# Patient Record
Sex: MALE | Race: WHITE | Employment: PART TIME | RURAL
[De-identification: names, ages, dates, MRNs, and addresses within clinical notes are randomized per-mention and may not be internally consistent; named-entity substitution may affect disease eponyms.]

---

## 2024-07-25 ENCOUNTER — HOSPITAL ENCOUNTER (INPATIENT)
Facility: HOSPITAL | Age: 24
LOS: 2 days | Discharge: HOME | DRG: 751 | End: 2024-07-27
Attending: EMERGENCY MEDICINE | Admitting: PSYCHIATRY & NEUROLOGY
Payer: COMMERCIAL

## 2024-07-25 DIAGNOSIS — F32.A ANXIETY AND DEPRESSION: ICD-10-CM

## 2024-07-25 DIAGNOSIS — R45.851 SUICIDAL IDEATION: Primary | ICD-10-CM

## 2024-07-25 DIAGNOSIS — F41.9 ANXIETY AND DEPRESSION: ICD-10-CM

## 2024-07-25 PROBLEM — F33.9 MAJOR DEPRESSIVE DISORDER, RECURRENT (HCC): Status: ACTIVE | Noted: 2024-07-25

## 2024-07-25 PROBLEM — F33.2 SEVERE RECURRENT MAJOR DEPRESSION WITHOUT PSYCHOTIC FEATURES (HCC): Status: ACTIVE | Noted: 2024-07-25

## 2024-07-25 LAB
AMPHET UR QL SCN: NEGATIVE
ANION GAP SERPL CALC-SCNC: 11 MMOL/L (ref 5–15)
APAP SERPL-MCNC: <2 UG/ML (ref 10–30)
APPEARANCE UR: CLEAR
BACTERIA URNS QL MICRO: NEGATIVE /HPF
BARBITURATES UR QL SCN: NEGATIVE
BASOPHILS # BLD: 0 K/UL (ref 0–0.1)
BASOPHILS NFR BLD: 1 % (ref 0–1)
BENZODIAZ UR QL: NEGATIVE
BILIRUB UR QL: NEGATIVE
BUN SERPL-MCNC: 11 MG/DL (ref 6–20)
BUN/CREAT SERPL: 11 (ref 12–20)
CALCIUM SERPL-MCNC: 10.1 MG/DL (ref 8.5–10.1)
CANNABINOIDS UR QL SCN: POSITIVE
CHLORIDE SERPL-SCNC: 103 MMOL/L (ref 97–108)
CO2 SERPL-SCNC: 28 MMOL/L (ref 21–32)
COCAINE UR QL SCN: NEGATIVE
COLOR UR: ABNORMAL
CREAT SERPL-MCNC: 1.01 MG/DL (ref 0.7–1.3)
DIFFERENTIAL METHOD BLD: NORMAL
EOSINOPHIL # BLD: 0.1 K/UL (ref 0–0.4)
EOSINOPHIL NFR BLD: 1 % (ref 0–7)
EPITH CASTS URNS QL MICRO: NORMAL /LPF
ERYTHROCYTE [DISTWIDTH] IN BLOOD BY AUTOMATED COUNT: 11.9 % (ref 11.5–14.5)
ETHANOL SERPL-MCNC: <10 MG/DL (ref 0–0.08)
GLUCOSE SERPL-MCNC: 95 MG/DL (ref 65–100)
GLUCOSE UR STRIP.AUTO-MCNC: NEGATIVE MG/DL
HCT VFR BLD AUTO: 41.3 % (ref 36.6–50.3)
HGB BLD-MCNC: 14.8 G/DL (ref 12.1–17)
HGB UR QL STRIP: ABNORMAL
IMM GRANULOCYTES # BLD AUTO: 0 K/UL (ref 0–0.04)
IMM GRANULOCYTES NFR BLD AUTO: 0 % (ref 0–0.5)
KETONES UR QL STRIP.AUTO: NEGATIVE MG/DL
LEUKOCYTE ESTERASE UR QL STRIP.AUTO: NEGATIVE
LYMPHOCYTES # BLD: 2 K/UL (ref 0.8–3.5)
LYMPHOCYTES NFR BLD: 35 % (ref 12–49)
Lab: ABNORMAL
MCH RBC QN AUTO: 29.7 PG (ref 26–34)
MCHC RBC AUTO-ENTMCNC: 35.8 G/DL (ref 30–36.5)
MCV RBC AUTO: 82.8 FL (ref 80–99)
METHADONE UR QL: NEGATIVE
MONOCYTES # BLD: 0.3 K/UL (ref 0–1)
MONOCYTES NFR BLD: 6 % (ref 5–13)
NEUTS SEG # BLD: 3.3 K/UL (ref 1.8–8)
NEUTS SEG NFR BLD: 57 % (ref 32–75)
NITRITE UR QL STRIP.AUTO: NEGATIVE
NRBC # BLD: 0 K/UL (ref 0–0.01)
NRBC BLD-RTO: 0 PER 100 WBC
OPIATES UR QL: NEGATIVE
PCP UR QL: NEGATIVE
PH UR STRIP: 8 (ref 5–8)
PLATELET # BLD AUTO: 236 K/UL (ref 150–400)
PMV BLD AUTO: 10 FL (ref 8.9–12.9)
POTASSIUM SERPL-SCNC: 3.9 MMOL/L (ref 3.5–5.1)
PROT UR STRIP-MCNC: ABNORMAL MG/DL
RBC # BLD AUTO: 4.99 M/UL (ref 4.1–5.7)
RBC #/AREA URNS HPF: NORMAL /HPF (ref 0–5)
SALICYLATES SERPL-MCNC: <1.7 MG/DL (ref 2.8–20)
SODIUM SERPL-SCNC: 142 MMOL/L (ref 136–145)
SP GR UR REFRACTOMETRY: 1.01 (ref 1–1.03)
UROBILINOGEN UR QL STRIP.AUTO: 0.2 EU/DL (ref 0.2–1)
WBC # BLD AUTO: 5.7 K/UL (ref 4.1–11.1)
WBC URNS QL MICRO: NORMAL /HPF (ref 0–4)

## 2024-07-25 PROCEDURE — 85025 COMPLETE CBC W/AUTO DIFF WBC: CPT

## 2024-07-25 PROCEDURE — 80179 DRUG ASSAY SALICYLATE: CPT

## 2024-07-25 PROCEDURE — 6370000000 HC RX 637 (ALT 250 FOR IP): Performed by: PSYCHIATRY & NEUROLOGY

## 2024-07-25 PROCEDURE — 36415 COLL VENOUS BLD VENIPUNCTURE: CPT

## 2024-07-25 PROCEDURE — 99285 EMERGENCY DEPT VISIT HI MDM: CPT

## 2024-07-25 PROCEDURE — 6370000000 HC RX 637 (ALT 250 FOR IP): Performed by: EMERGENCY MEDICINE

## 2024-07-25 PROCEDURE — 80048 BASIC METABOLIC PNL TOTAL CA: CPT

## 2024-07-25 PROCEDURE — 80143 DRUG ASSAY ACETAMINOPHEN: CPT

## 2024-07-25 PROCEDURE — 81001 URINALYSIS AUTO W/SCOPE: CPT

## 2024-07-25 PROCEDURE — 1240000000 HC EMOTIONAL WELLNESS R&B

## 2024-07-25 PROCEDURE — 80307 DRUG TEST PRSMV CHEM ANLYZR: CPT

## 2024-07-25 PROCEDURE — 90791 PSYCH DIAGNOSTIC EVALUATION: CPT

## 2024-07-25 PROCEDURE — 82077 ASSAY SPEC XCP UR&BREATH IA: CPT

## 2024-07-25 RX ORDER — ACETAMINOPHEN 325 MG/1
650 TABLET ORAL
Status: COMPLETED | OUTPATIENT
Start: 2024-07-25 | End: 2024-07-25

## 2024-07-25 RX ORDER — ACETAMINOPHEN 325 MG/1
650 TABLET ORAL EVERY 4 HOURS PRN
Status: DISCONTINUED | OUTPATIENT
Start: 2024-07-25 | End: 2024-07-27 | Stop reason: HOSPADM

## 2024-07-25 RX ORDER — POLYETHYLENE GLYCOL 3350 17 G/17G
17 POWDER, FOR SOLUTION ORAL DAILY PRN
Status: DISCONTINUED | OUTPATIENT
Start: 2024-07-25 | End: 2024-07-27 | Stop reason: HOSPADM

## 2024-07-25 RX ORDER — MAGNESIUM HYDROXIDE/ALUMINUM HYDROXICE/SIMETHICONE 120; 1200; 1200 MG/30ML; MG/30ML; MG/30ML
30 SUSPENSION ORAL EVERY 6 HOURS PRN
Status: DISCONTINUED | OUTPATIENT
Start: 2024-07-25 | End: 2024-07-27 | Stop reason: HOSPADM

## 2024-07-25 RX ORDER — TRAZODONE HYDROCHLORIDE 50 MG/1
50 TABLET ORAL NIGHTLY PRN
Status: DISCONTINUED | OUTPATIENT
Start: 2024-07-25 | End: 2024-07-27 | Stop reason: HOSPADM

## 2024-07-25 RX ORDER — HYDROXYZINE HYDROCHLORIDE 25 MG/1
50 TABLET, FILM COATED ORAL 3 TIMES DAILY PRN
Status: DISCONTINUED | OUTPATIENT
Start: 2024-07-25 | End: 2024-07-27 | Stop reason: HOSPADM

## 2024-07-25 RX ORDER — LORAZEPAM 2 MG/ML
1 INJECTION INTRAMUSCULAR EVERY 6 HOURS PRN
Status: DISCONTINUED | OUTPATIENT
Start: 2024-07-25 | End: 2024-07-27 | Stop reason: HOSPADM

## 2024-07-25 RX ORDER — IBUPROFEN 400 MG/1
400 TABLET ORAL EVERY 6 HOURS PRN
Status: DISCONTINUED | OUTPATIENT
Start: 2024-07-25 | End: 2024-07-27 | Stop reason: HOSPADM

## 2024-07-25 RX ORDER — LORAZEPAM 1 MG/1
1 TABLET ORAL EVERY 6 HOURS PRN
Status: DISCONTINUED | OUTPATIENT
Start: 2024-07-25 | End: 2024-07-27 | Stop reason: HOSPADM

## 2024-07-25 RX ORDER — POLYETHYLENE GLYCOL 3350 17 G
2 POWDER IN PACKET (EA) ORAL
Status: DISCONTINUED | OUTPATIENT
Start: 2024-07-25 | End: 2024-07-27 | Stop reason: HOSPADM

## 2024-07-25 RX ADMIN — NICOTINE POLACRILEX 2 MG: 2 LOZENGE ORAL at 18:42

## 2024-07-25 RX ADMIN — HYDROXYZINE HYDROCHLORIDE 50 MG: 25 TABLET ORAL at 15:27

## 2024-07-25 RX ADMIN — ACETAMINOPHEN 650 MG: 325 TABLET ORAL at 12:52

## 2024-07-25 RX ADMIN — NICOTINE POLACRILEX 2 MG: 2 LOZENGE ORAL at 15:27

## 2024-07-25 NOTE — ED TRIAGE NOTES
Pt arrived with complaint of SI.  Pt reports he has a history of depression, anxiety and PTSD but not on medications at this time.  Pt reports problems at home and in his relationship, pt tearful at triage.  Pt reports that his plan would be to either use a knife or gun to kill himself, pt reports no guns in his home but he does have access to them.  Pt is awake and alert.  Pt educated on ER flow

## 2024-07-25 NOTE — ED NOTES
Left message with [facility] regarding this patient needing to be evaluated  
Pt reports pain (4/10) on right side of his head. MD rock  
Pt transferred to [facility] via wheelchair with security  
Reyt consult with sujit at this time.  
Security notified of patient and that he will have to sit with patient  
Shannon called from [employer]  pt is high risk and the plan will be to admit  
TRANSFER - OUT REPORT:    Verbal report given to Jesusita Gill RN on Pablo Membreno  being transferred to [facility] for routine progression of patient care       Report consisted of patient's Situation, Background, Assessment and   Recommendations(SBAR).     Information from the following report(s) ED Encounter Summary, ED SBAR, MAR, Recent Results, and Neuro Assessment was reviewed with the receiving nurse.    [place] Fall Assessment:    Presents to emergency department  because of falls (Syncope, seizure, or loss of consciousness): No  Age > 70: No  Altered Mental Status, Intoxication with alcohol or substance confusion (Disorientation, impaired judgment, poor safety awaremess, or inability to follow instructions): No  Impaired Mobility: Ambulates or transfers with assistive devices or assistance; Unable to ambulate or transer.: No  Nursing Judgement: No          Lines:       Opportunity for questions and clarification was provided.      Patient transported with:  Tech       
PATIENT UNDRESSED? Yes  ARE THERE WOUNDS PRESENT? No  ARE THE WOUNDS DOCUMENTED? N/a    RESTRAINTS IN USE: No    IS THE DOCUMENTATION COMPLETE? N/a  Is there a current order?  N/a  When does it ? N/a       Vital Signs:  MEWS Score: 1/ Level of Consciousness: Alert (0)    Vitals:    24 1034   BP: 128/78   Pulse: 83   Resp: 20   Temp: 98.3 °F (36.8 °C)   TempSrc: Oral   SpO2: 100%   Weight: 70.3 kg (155 lb)   Height: 1.727 m (5' 8\")          Pain 1: 3  Pain Scale 1: 0-10    REVIEW:    IP UNIT CALLED NOTE IS READY: Yes

## 2024-07-25 NOTE — CONSULTS
Objective:   VITALS:    Patient Vitals for the past 24 hrs:   BP Temp Temp src Pulse Resp SpO2 Height Weight   24 1413 125/80 97.5 °F (36.4 °C) Oral 67 20 100 % 1.727 m (5' 8\") 69.2 kg (152 lb 9.6 oz)   24 1034 128/78 98.3 °F (36.8 °C) Oral 83 20 100 % 1.727 m (5' 8\") 70.3 kg (155 lb)       Temp (24hrs), Av.9 °F (36.6 °C), Min:97.5 °F (36.4 °C), Max:98.3 °F (36.8 °C)        O2 Device: None (Room air)    Wt Readings from Last 12 Encounters:   24 69.2 kg (152 lb 9.6 oz)         PHYSICAL EXAM:  General:    Alert, cooperative, appears stated age.     Lungs:   CTA b/l.  No wheezing or Rhonchi. No rales.  Chest wall:  No tenderness.  No accessory muscle use.  Heart:   Regular  rhythm,  No  Murmur.   No edema  Abdomen:   Soft, NT. ND  BS+  Extremities: No cyanosis.  No clubbing,      Skin turgor normal, Radial dial pulse 2+. Capillary refill normal  Neurologic: No facial asymmetry. No aphasia or slurred speech. Symmetrical strength, Sensation grossly intact. AAOx4.         LAB DATA REVIEWED:    Recent Results (from the past 12 hour(s))   Basic Metabolic Panel    Collection Time: 24 11:00 AM   Result Value Ref Range    Sodium 142 136 - 145 mmol/L    Potassium 3.9 3.5 - 5.1 mmol/L    Chloride 103 97 - 108 mmol/L    CO2 28 21 - 32 mmol/L    Anion Gap 11 5 - 15 mmol/L    Glucose 95 65 - 100 mg/dL    BUN 11 6 - 20 MG/DL    Creatinine 1.01 0.70 - 1.30 MG/DL    BUN/Creatinine Ratio 11 (L) 12 - 20      Est, Glom Filt Rate >90 >60 ml/min/1.73m2    Calcium 10.1 8.5 - 10.1 MG/DL   Ethanol    Collection Time: 24 11:00 AM   Result Value Ref Range    Ethanol Lvl <10 <10 MG/DL   Acetaminophen Level “IF” accidental or intentional ingestion.    Collection Time: 24 11:00 AM   Result Value Ref Range    Acetaminophen Level <2 (L) 10 - 30 ug/mL   Salicylate “IF” accidental or intentional ingestion.    Collection Time: 24 11:00 AM   Result Value Ref Range    Salicylate Lvl <1.7 (L) 2.8 -

## 2024-07-25 NOTE — [facility] NOTE
[facility] assessment completed, and suicide risk level noted to be high. Charge Nurse, Alison and Physician, Dr. Mirella Macdonald notified. Concerns not observed.  Alison confirms pt has security present in the room for constant observation at this time.        
[employer] notified regarding need for consult.   
voiced at this time.    The patient's greatest support comes from his mother and this person will be involved with the treatment.    The patient has not been in an event described as horrible or outside the realm of ordinary life experience either currently or in the past.  The patient has not been a victim of sexual/physical abuse.    Section VII - Other Areas of Clinical Concern  The highest grade achieved is some college with the overall quality of school experience being described as not assessed.  The patient is currently employed and speaks English as a primary language.  The patient has no communication impairments affecting communication. The patient's preference for learning can be described as: can read and write adequately.  The patient's hearing is normal.  The patient's vision is normal.      Anne-Marie Mendez LMSW

## 2024-07-26 PROBLEM — F33.1 MAJOR DEPRESSIVE DISORDER, RECURRENT EPISODE, MODERATE (HCC): Status: ACTIVE | Noted: 2024-07-25

## 2024-07-26 PROCEDURE — 6370000000 HC RX 637 (ALT 250 FOR IP): Performed by: PSYCHIATRY & NEUROLOGY

## 2024-07-26 PROCEDURE — 1240000000 HC EMOTIONAL WELLNESS R&B

## 2024-07-26 RX ORDER — BUPROPION HYDROCHLORIDE 150 MG/1
300 TABLET ORAL DAILY
Status: DISCONTINUED | OUTPATIENT
Start: 2024-07-28 | End: 2024-07-27 | Stop reason: HOSPADM

## 2024-07-26 RX ORDER — BUPROPION HYDROCHLORIDE 150 MG/1
150 TABLET ORAL DAILY
Status: COMPLETED | OUTPATIENT
Start: 2024-07-26 | End: 2024-07-27

## 2024-07-26 RX ORDER — BUPROPION HYDROCHLORIDE 300 MG/1
300 TABLET ORAL DAILY
Qty: 30 TABLET | Refills: 3 | Status: SHIPPED | OUTPATIENT
Start: 2024-07-28

## 2024-07-26 RX ADMIN — BUPROPION HYDROCHLORIDE 150 MG: 150 TABLET, EXTENDED RELEASE ORAL at 08:44

## 2024-07-26 RX ADMIN — NICOTINE POLACRILEX 2 MG: 2 LOZENGE ORAL at 20:29

## 2024-07-26 RX ADMIN — NICOTINE POLACRILEX 2 MG: 2 LOZENGE ORAL at 10:26

## 2024-07-26 RX ADMIN — NICOTINE POLACRILEX 2 MG: 2 LOZENGE ORAL at 12:17

## 2024-07-26 RX ADMIN — NICOTINE POLACRILEX 2 MG: 2 LOZENGE ORAL at 17:52

## 2024-07-26 RX ADMIN — NICOTINE POLACRILEX 2 MG: 2 LOZENGE ORAL at 14:56

## 2024-07-26 NOTE — PLAN OF CARE
Problem: Pain  Goal: Verbalizes/displays adequate comfort level or baseline comfort level  7/26/2024 1254 by Virginia Camilo, RN  Outcome: Progressing  7/25/2024 2318 by Aniceto Denise, RN  Outcome: Progressing

## 2024-07-26 NOTE — GROUP NOTE
Group Therapy Note    Date: 7/26/2024    Group Start Time: 0900  Group End Time: 0950  Group Topic: Process Group - Inpatient    [facility]    Bhargavi Mulligan LCSW        Group Therapy Note    Attendees: 6 scheduled    Focus of session was on handout from [employer] “Growth Mindset.”  We discussed the definition of a growth mindset which is believing you can develop abilities through hard work. In contrast, a fixed mindset means believing abilities are innate-you either have them or you don't.  We spoke about how having a growth mindset will allow pt to embrace challenges as opportunities to learn and grow.  We spoke about the tips to foster and strengthen a growth mindset and the impact that will have on patients' day to day life.         Patient's Goal:   Discharge to home or other facility with appropriate resources     Notes:  Will participated in group with prompting.  He spoke about how he is motivated and hopeful for his future.  He spoke about his goals are to go finish school and be able to move out on his own.  He spoke about how he is ready to start therapy again and he has \"a lot to talk about and work on.\"      Status After Intervention:  Improved    Participation Level: Active Listener and Interactive    Participation Quality: Appropriate, Attentive, Sharing, and Supportive      Speech:  normal      Thought Process/Content: Logical  Linear      Affective Functioning: Congruent      Mood: anxious      Level of consciousness:  Alert, Oriented x4, and Attentive      Response to Learning: Able to verbalize current knowledge/experience      Endings: None Reported    Modes of Intervention: Education, Support, Socialization, Exploration, and Clarifying      Discipline Responsible: /Counselor      Signature:  Bhargavi Mulligan LCSW

## 2024-07-26 NOTE — PLAN OF CARE
Problem: Discharge Planning  Goal: Discharge to home or other facility with appropriate resources  Outcome: Progressing  Flowsheets (Taken 7/25/2024 1419 by Carolynn Dugan, RN)  Discharge to home or other facility with appropriate resources: Identify barriers to discharge with patient and caregiver     Problem: Pain  Goal: Verbalizes/displays adequate comfort level or baseline comfort level  Outcome: Progressing

## 2024-07-26 NOTE — H&P
[address]  [address]                                PSYCH H&P      PATIENT NAME: ROSAURA ADAM             : 2000  MED REC NO: [id number]                       ROOM: 234  ACCOUNT NO: [id number]                       ADMIT DATE: 2024  PROVIDER: Alexandre Guzman MD      HISTORY OF PRESENT ILLNESS:  The patient is a 23-year-old single male, who was admitted voluntarily through the [facility] after presenting there with worsening symptoms of depression over the past month, to the point where he was starting to have some suicidal thoughts.  Although he had not made any attempts to ever harm himself, he stated he was having fleeting thoughts about using a gun or a knife, although he never came close to acting on it.  However, he realized he needed to reach out for some help and came to the emergency room.  He states that a particular trigger that led to him coming in was having essentially a slight argument with his father who is a .  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  This was obviously upsetting to him and he then got into argument with his partner, which made him feel even worse by himself and started triggering some suicidal thoughts.    He states that his neurovegetative functioning has been worsening over the past month to where he is only sleeping about 4-5 hours.  He is not eating well and has lost 20 to 25 pounds over the past several months.  His energy level is at least fair and he states he still has some motivation to do things, and he also states that he is not severely anhedonic.  However, he has moments of feeling helpless and hopeless, as well as having excessive feelings of guilt.  He denies any history of symptoms of alexis or

## 2024-07-27 VITALS
SYSTOLIC BLOOD PRESSURE: 102 MMHG | DIASTOLIC BLOOD PRESSURE: 89 MMHG | WEIGHT: 152.6 LBS | RESPIRATION RATE: 16 BRPM | TEMPERATURE: 97.7 F | HEIGHT: 68 IN | BODY MASS INDEX: 23.13 KG/M2 | OXYGEN SATURATION: 100 % | HEART RATE: 71 BPM

## 2024-07-27 PROCEDURE — 6370000000 HC RX 637 (ALT 250 FOR IP): Performed by: PSYCHIATRY & NEUROLOGY

## 2024-07-27 RX ADMIN — BUPROPION HYDROCHLORIDE 150 MG: 150 TABLET, EXTENDED RELEASE ORAL at 08:04

## 2024-07-27 NOTE — PLAN OF CARE
Problem: Discharge Planning  Goal: Discharge to home or other facility with appropriate resources  Outcome: Adequate for Discharge  Flowsheets (Taken 7/27/2024 0806)  Discharge to home or other facility with appropriate resources: Identify barriers to discharge with patient and caregiver     Problem: Pain  Goal: Verbalizes/displays adequate comfort level or baseline comfort level  7/27/2024 1023 by Virginia Camilo RN  Outcome: Adequate for Discharge  7/27/2024 0906 by Virginia Camilo RN  Outcome: Progressing     Problem: Self Harm/Suicidality  Goal: Absence of physical injury  Description: Absence of physical injury  7/27/2024 1023 by Virginia Camilo RN  Outcome: Adequate for Discharge  7/27/2024 0906 by Virginia Camilo RN  Outcome: Progressing     Problem: Depression  Goal: Appears well-rested  Description: Appears well-rested  7/27/2024 1023 by Virginia Camilo RN  Outcome: Adequate for Discharge  7/27/2024 0906 by Virginia Camilo RN  Outcome: Progressing  Goal: No signs of behavioral stress  Description: No signs of behavioral stress  7/27/2024 1023 by Virginia Camilo RN  Outcome: Adequate for Discharge  7/27/2024 0906 by Virginia Camilo RN  Outcome: Progressing     Problem: Anxiety  Goal: Knowledge of positive coping patterns  Description: Knowledge of positive coping patterns  Outcome: Adequate for Discharge

## 2024-07-27 NOTE — BH NOTE
Behavioral Health Interdisciplinary Rounds     Patient Name: Pablo Membreno  Age: 23 y.o.  Room/Bed:  234/01  Primary Diagnosis: Severe recurrent major depression without psychotic features (HCC)   Admission Status: Voluntary     Readmission within 30 days: No  Power of  in place: No  Patient requires a blocked bed: No14}          Reason for blocked bed:     Sleep hours: 8.45       Participation in Care/Groups:  Yes  Medication Compliant?: Yes  PRNS (last 24 hours): Antianxiety    Restraints (last 24 hours):  No  Substance Abuse:  No    24 hour chart check complete: Yes    Patient goal(s) for today: to get connected with services and be discharged soon  Treatment team focus/goals:  Discharge to home or other facility with appropriate resources   Progress note: patient will be started on medication and will be referred to Dr. Guzman for outpatient    LOS:  1  Expected LOS: 1    Financial concerns/prescription coverage:  No  Family contact: no      Family requesting physician contact today:  No  Discharge plan: return home with outpatient providers  Access to weapons: No       Outpatient provider(s): will follow up with Dr. Guzman  Patient's preferred phone number for follow up call: [phone]     Participating treatment team members: Teressa Boss, Dr. Guzman, Mervat Mulligan (assigned SW), Mervat Mulligan    
Affect appropriate, mood depressed/anxious. Patient a & o x 4.  Denies pain. No SI, HI, AVH. Patient tolerated medications well, cooperative. Patient is expected to be discharged 7/27/2024. Consumed evening snacks and talked to peers in the dayroom.  Currently sleeping during the time of this note.    PRN Medication Documentation    Specific patient behavior that led to the need for PRN medication: Nicotine Withdrawal  Staff interventions attempted prior to PRN being given: Patient Requested  PRN medication given: Nicotine Lozenge 2mg at 2029  Patient responsiveness/effectiveness of PRN medication: Tolerated Well, Effective    Slept 6h 45min  
Affect appropriate, mood sad. Patient a & o x 4.  Denies pain. No SI, HI, AVH. Patient had no scheduled night medications, and declined PRN sleep medication.  He is withdrawn to his room, but is pleasant and cooperative with staff . Patient is currently sleeping during the time of this note. Staff continues to monitor pt closely for safety.                                                                                                                                                                                                                                                                                                                                         Slept 8h 45min  
Affect blunted.  Mood anxious/depressed.  Alert and oriented x 4.  Cooperative and med compliant.  Speech linear and logical.  Attended group as active participant.  Interacted well with others and stayed in the day room most of the day talking.  Denies SI/HI/AVH/pain. Denies SE's from meds.  Showered.  PRN Medication Documentation    Specific patient behavior that led to need for PRN medication: smoking cravings  Staff interventions attempted prior to PRN being given: assess  PRN medication given: Nicotine lozenge 2mg at 1026,1217, 1456  Patient response/effectiveness of PRN medication: \"it helps me not want to vape\"    
Affect brightened; mood anxious.  Ate 100% of meals.  All belongings returned to patient.  Verbalized understanding of DC instructions.  Denies SI/HI/AVH/pain. Med compliant.  DC with mother at 0958.  
[facility]  Master Treatment Plan for Pablo Membreno    Date Treatment Plan Initiated: 07/25/2024    Treatment Plan Modalities:  Type of Modality Amount  (x minutes) Frequency (x/week) Duration (x days) Name of Responsible Staff   Community & wrap-up meetings to encourage peer interactions 30 14 1 Queenie ZAVALA AKILA   Group psychotherapy to assist in building coping skills and internal controls 60 5 1 ANNETTE Kaiser, ANNETTE   Therapeutic activity groups to build coping skills 60 7 1 Virginia PALMER, RN  Aniceto PARRA, CHRISTY ZAVALA, CHRISTY     Psychoeducation in group setting to address:   Medication education  Coping Skills  Symptom Management   60 7 1 ANNETTE Kaiser, ANNETTE BATISTA RN   Discharge planning   60 2 1 Teressa YOUNG AKILA II            Physician medication management   15 7 1 CATRACHITO Guzman MD                                         Treatment Team Signatures    I have participated in the development of this plan of treatment and agree to its implementation.    Patient Signature       Patient Printed Name Date/Time   Social Work/Therapist Signature       Social Work/Therapist Printed Name Date/Time   RN Signature       RN Printed Name    Carolynn Dugan RN Date/Time    07/25/2024 1437   Other Signature     Other Signature Date/Time   MD Signature       MD Printed Name Date/Time     
DISCHARGE SUMMARY    Some parts of the discharge summary are from the initial Psychiatric interview that was done on admission by the admitting psychiatrist.      Date of Admission: 7/25/2024    Date of Discharge:7/27/2024     TYPE OF DISCHARGE:   REGULAR -  YES    AMA - NO  RELEASED BY THE TDO COURT - NO    ADMISSION EVALUATION:    HISTORY OF PRESENT ILLNESS:  The patient is a 23-year-old single male, who was admitted voluntarily through the [facility] Emergency Room after presenting there with worsening symptoms of depression over the past month, to the point where he was starting to have some suicidal thoughts.  Although he had not made any attempts to ever harm himself, he stated he was having fleeting thoughts about using a gun or a knife, although he never came close to acting on it.  However, he realized he needed to reach out for some help and came to the emergency room.  He states that a particular trigger that led to him coming in was having essentially a slight argument with his father who is a .  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  This was obviously upsetting to him and he then got into argument with his partner, which made him feel even worse by himself and started triggering some suicidal thoughts.     He states that his neurovegetative functioning has been worsening over the past month to where he is only sleeping about 4-5 hours.  He is not eating well and has lost 20 to 25 pounds over the past several months.  His energy level is at least fair and he states he still has some motivation to do things, and he also states that he is not severely anhedonic.  However, he has moments of feeling helpless and hopeless, as well as having excessive feelings of guilt.  He denies any history of symptoms of alexis or hypomania and also denies any 
PRN Medication Documentation     Specific patient behavior that led to the need for PRN medication: anxiety  Staff interventions attempted prior to PRN being given: patient requested  PRN medication given: Atarax 50 mg PO @ 1527   Patient responsiveness/effectiveness of PRN medication: pt stated he feels less anxious    PRN Medication Documentation     Specific patient behavior that led to the need for PRN medication: nicotine craving   Staff interventions attempted prior to PRN being given: patient requested  PRN medication given: Nicotine 2 mg PO @ 1527  Patient responsiveness/effectiveness of PRN medication: pt stated has less cravings  
PSYCHOSOCIAL ASSESSMENT  :Patient identifying info:   Pablo Membreno is a 23 y.o., male admitted 7/25/2024 10:38 AM     Presenting problem and precipitating factors: Per Dr. Guzman H &P:  The patient is a 23-year-old single male, who was admitted voluntarily through the [facility] Emergency Room after presenting there with worsening symptoms of depression over the past month, to the point where he was starting to have some suicidal thoughts.  Although he had not made any attempts to ever harm himself, he stated he was having fleeting thoughts about using a gun or a knife, although he never came close to acting on it.  However, he realized he needed to reach out for some help and came to the emergency room.  He states that a particular trigger that led to him coming in was having essentially a slight argument with his father who is a .  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  This was obviously upsetting to him and he then got into argument with his partner, which made him feel even worse by himself and started triggering some suicidal thoughts.     He states that his neurovegetative functioning has been worsening over the past month to where he is only sleeping about 4-5 hours.  He is not eating well and has lost 20 to 25 pounds over the past several months.  His energy level is at least fair and he states he still has some motivation to do things, and he also states that he is not severely anhedonic.  However, he has moments of feeling helpless and hopeless, as well as having excessive feelings of guilt.  He denies any history of symptoms of alexis or hypomania and also denies any history of symptoms of psychosis.  Similarly, he has no major substance abuse issues, although he does smoke THC on a daily basis.  He does not think that it adversely affects 
Patient was admitted to the unit at 1352 on a voluntary basis . He arrived on the until with hospital security from [facility] ED. Dr. Guzman and nursing supervisor were notified of patient's arrival. Dr. Wilfredo Louis has been notified to see the patient for a medical h&p.   
Patient was admitted voluntary to unit with suicidal ideations which have been worsening over the past few days prior to admission. He does not have any outpatient providers and he has never been hospitalized. He is employed and lives with his parents. He does plan to return back home with his parents. He is agreeable to outpatient treatment. He will be scheduled to see Dr. Guzman for medication management and will be referred to [facility] for therapy. Patient is able to do his ADL care. He is pleasant and cooperative.  
Per Dr Guzman, pt does not need a 1:1 while on BHU.  
TRANSFER - IN REPORT:    Verbal report received from Jesusita Knight RN on Pablo Membreno  being received from [facility] ED for admission to [facility] inpatient.     Report consisted of patient’s Situation, Background, Assessment and   Recommendations(SBAR).     Information from the following report(s)  SBAR, Kardex, ED Summary, MAR, Recent Results and Med Rec Status was reviewed with the receiving nurse.     Opportunity for questions and clarification was provided.      Assessment completed upon patient’s arrival to unit and care assumed.         
decision maker? No  Does the patient have a Medical Advance Directive? No  Does the patient have a Psychiatric Advance Directive? No  If the patient does not have a surrogate or Medical Advance Directive AND Psychiatric Advance Directive, the patient was offered information on these advance directives yes, refused    Patient Instructions: Please continue all medications until otherwise directed by physician.      Tobacco Cessation Discharge Plan:   Is the patient a smoker and needs referral for smoking cessation? Refused  Patient referred to the following for smoking cessation with an appointment? Refused    Patient was offered medication to assist with smoking cessation at discharge? Yes  Was education for smoking cessation added to the discharge instructions? Yes    Alcohol/Substance Abuse Discharge Plan:   Does the patient have a history of substance/alcohol abuse and requires a referral for treatment? No  Patient referred to the following for substance/alcohol abuse treatment with an appointment? N/a  Patient was offered medication to assist with alcohol cessation at discharge? N/a  Was education for substance/alcohol abuse added to discharge instructions? Not applicable    Patient discharged to Home/Self Care. Discharge information provided to the patient/caregiver either in hard copy or electronically.

## 2024-07-27 NOTE — PLAN OF CARE
Problem: Discharge Planning  Goal: Discharge to home or other facility with appropriate resources  Outcome: Progressing  Flowsheets (Taken 7/26/2024 0805 by Virginia Camilo, RN)  Discharge to home or other facility with appropriate resources: Identify barriers to discharge with patient and caregiver     Problem: Pain  Goal: Verbalizes/displays adequate comfort level or baseline comfort level  7/26/2024 2011 by Aniceto Denise, RN  Outcome: Progressing     Problem: Self Harm/Suicidality  Goal: Absence of physical injury  Description: Absence of physical injury  Outcome: Progressing     Problem: Depression  Goal: Appears well-rested  Description: Appears well-rested  Outcome: Progressing     Problem: Depression  Goal: No signs of behavioral stress  Description: No signs of behavioral stress  Outcome: Progressing     Problem: Anxiety  Goal: Knowledge of positive coping patterns  Description: Knowledge of positive coping patterns  Outcome: Progressing

## 2024-07-27 NOTE — PLAN OF CARE
Problem: Pain  Goal: Verbalizes/displays adequate comfort level or baseline comfort level  7/27/2024 0906 by Virginia Camilo, RN  Outcome: Progressing  7/26/2024 2011 by Aniceto Denise, RN  Outcome: Progressing     Problem: Self Harm/Suicidality  Goal: Absence of physical injury  Description: Absence of physical injury  7/27/2024 0906 by Virginia Camilo, RN  Outcome: Progressing  7/26/2024 2011 by Aniceto Denise, RN  Outcome: Progressing     Problem: Depression  Goal: Appears well-rested  Description: Appears well-rested  7/27/2024 0906 by Virginia Camilo, RN  Outcome: Progressing  7/26/2024 2011 by Aniceto Denise, RN  Outcome: Progressing  Goal: No signs of behavioral stress  Description: No signs of behavioral stress  7/27/2024 0906 by Virginia Camilo RN  Outcome: Progressing  7/26/2024 2011 by Aniceto Denise, RN  Outcome: Progressing

## 2024-07-27 NOTE — DISCHARGE INSTRUCTIONS
BEHAVIORAL HEALTH NURSING DISCHARGE NOTE      The following personal items collected during your admission are returned to you:   Dental Appliance:    Vision:    Hearing Aid:    Jewelry:    Clothing:    Other Valuables:    Valuables sent to safe:        PATIENT INSTRUCTIONS:    What to do at Home:    Avoid making any critical decisions for at least 24-hours.    Recommended diet: regular diet,     Recommended activity: activity as tolerated,     Follow-up with Dr. Guzman for med management on 9/6 at 3:30. Call Derek Do for outpt therapy appt. If you have problems relating to your recovery, call your physician.    The discharge information has been reviewed with the patient.  The patient verbalized understanding.

## 2024-07-30 ENCOUNTER — TELEPHONE (OUTPATIENT)
Facility: HOSPITAL | Age: 24
End: 2024-07-30

## 2024-07-30 RX ORDER — BUPROPION HYDROCHLORIDE 150 MG/1
150 TABLET ORAL DAILY
Qty: 30 TABLET | Refills: 1 | Status: SHIPPED | OUTPATIENT
Start: 2024-07-30

## 2024-07-30 NOTE — TELEPHONE ENCOUNTER
States he's feeling almost a little hypomanic--poor sleep, racing thoughts, no appetite, etc. Wants to decrease the WB XL to 150 mg which I will do. Educated on what to watch out for even with no prior h/o BPAD-type symptoms.

## 2024-09-06 ENCOUNTER — HOSPITAL ENCOUNTER (OUTPATIENT)
Facility: HOSPITAL | Age: 24
Discharge: HOME | End: 2024-09-09
Payer: COMMERCIAL

## 2024-09-06 PROBLEM — F33.0 MAJOR DEPRESSIVE DISORDER, RECURRENT EPISODE, MILD (HCC): Status: ACTIVE | Noted: 2024-07-25

## 2024-09-06 PROCEDURE — 99214 OFFICE O/P EST MOD 30 MIN: CPT | Performed by: PSYCHIATRY & NEUROLOGY

## 2024-09-06 RX ORDER — BUPROPION HYDROCHLORIDE 150 MG/1
150 TABLET ORAL DAILY
Qty: 30 TABLET | Refills: 5 | Status: SHIPPED | OUTPATIENT
Start: 2024-09-06

## 2025-03-06 ENCOUNTER — HOSPITAL ENCOUNTER (OUTPATIENT)
Facility: HOSPITAL | Age: 25
Discharge: HOME | End: 2025-03-09
Payer: COMMERCIAL

## 2025-03-06 DIAGNOSIS — F33.41 MAJOR DEPRESSIVE DISORDER, RECURRENT EPISODE, IN PARTIAL REMISSION: Primary | ICD-10-CM

## 2025-03-06 PROCEDURE — 99214 OFFICE O/P EST MOD 30 MIN: CPT | Performed by: PSYCHIATRY & NEUROLOGY

## 2025-03-06 RX ORDER — BUPROPION HYDROCHLORIDE 150 MG/1
150 TABLET ORAL DAILY
Qty: 30 TABLET | Refills: 5 | Status: SHIPPED | OUTPATIENT
Start: 2025-03-06

## 2025-05-12 ENCOUNTER — HOSPITAL ENCOUNTER (EMERGENCY)
Facility: HOSPITAL | Age: 25
Discharge: HOME | End: 2025-05-12
Attending: FAMILY MEDICINE | Admitting: FAMILY MEDICINE

## 2025-05-12 VITALS
RESPIRATION RATE: 16 BRPM | HEIGHT: 67 IN | TEMPERATURE: 98.1 F | HEART RATE: 92 BPM | SYSTOLIC BLOOD PRESSURE: 121 MMHG | OXYGEN SATURATION: 100 % | WEIGHT: 155 LBS | BODY MASS INDEX: 24.33 KG/M2 | DIASTOLIC BLOOD PRESSURE: 71 MMHG

## 2025-05-12 DIAGNOSIS — S61.210A LACERATION OF RIGHT INDEX FINGER WITHOUT FOREIGN BODY WITHOUT DAMAGE TO NAIL, INITIAL ENCOUNTER: Primary | ICD-10-CM

## 2025-05-12 PROCEDURE — 99282 EMERGENCY DEPT VISIT SF MDM: CPT

## 2025-05-12 ASSESSMENT — LIFESTYLE VARIABLES
HOW OFTEN DO YOU HAVE A DRINK CONTAINING ALCOHOL: NEVER
HOW MANY STANDARD DRINKS CONTAINING ALCOHOL DO YOU HAVE ON A TYPICAL DAY: PATIENT DOES NOT DRINK

## 2025-05-12 ASSESSMENT — PAIN - FUNCTIONAL ASSESSMENT: PAIN_FUNCTIONAL_ASSESSMENT: NONE - DENIES PAIN

## 2025-05-12 NOTE — ED TRIAGE NOTES
Right index finger laceration from an espresso machine 2 hrs prior to arrival , tdap is up to date

## 2025-05-12 NOTE — ED NOTES
1cm , flap laceration to posterior index knuckle, bleeding controlled , cleaned and new band aid applied per verbal orders

## 2025-05-13 NOTE — ED TRIAGE NOTES
[facility] EMERGENCY DEPARTMENT  EMERGENCY DEPARTMENT ENCOUNTER       Pt Name: Pablo Membreno  MRN: [id number]  Birthdate 2000  Date of evaluation: 5/12/2025  Provider: Rosa Esposito MD   PCP: Joaquin Fierro APRN - NP  Note Started: 4:19 PM EDT 5/13/25     CHIEF COMPLAINT       Chief Complaint   Patient presents with    Laceration        HISTORY OF PRESENT ILLNESS: 1 or more elements      History From: Patient  HPI Limitations: None     Pablo Membreno is a 24 y.o. adult who presents to the ED with a cut on his finger from an espresso machine.      Nursing Notes were all reviewed and agreed with or any disagreements were addressed in the HPI.     REVIEW OF SYSTEMS      Review of Systems     Positives and Pertinent negatives as per HPI.    PAST HISTORY     Past Medical History:  Past Medical History:   Diagnosis Date    Anxiety     Depression     PTSD (post-traumatic stress disorder)     Tonsil stone          Past Surgical History:  Past Surgical History:   Procedure Laterality Date    TONSILLECTOMY Bilateral 05/2019       Family History:  History reviewed. No pertinent family history.    Social History:  Social History     Tobacco Use    Smoking status: Never    Smokeless tobacco: Never   Substance Use Topics    Alcohol use: No    Drug use: Yes     Types: Marijuana (Weed)       Allergies:  No Known Allergies    CURRENT MEDICATIONS      Discharge Medication List as of 5/12/2025 11:09 AM        CONTINUE these medications which have NOT CHANGED    Details   buPROPion (WELLBUTRIN XL) 150 MG extended release tablet Take 1 tablet by mouth daily, Disp-30 tablet, R-5Normal             SCREENINGS               No data recorded        PHYSICAL EXAM      ED Triage Vitals [05/12/25 1033]   Encounter Vitals Group      /71      Systolic BP Percentile       Diastolic BP Percentile       Pulse 92      Respirations 16      Temp 98.1 °F (36.7 °C)      Temp Source Oral      SpO2 100 %      Weight -

## 2025-05-13 NOTE — ED PROVIDER NOTES
Pt Name: Pablo Membreno  MRN: [id number]  Birthdate 2000  Date of evaluation: 5/12/2025  Provider: Rosa Esposito MD   PCP: Joaquin Fierro APRN - NP  Note Started: 4:19 PM EDT 5/13/25     CHIEF COMPLAINT           Chief Complaint   Patient presents with    Laceration         HISTORY OF PRESENT ILLNESS: 1 or more elements      History From: Patient  HPI Limitations: None     Pablo Membreno is a 24 y.o. adult who presents to the ED with a cut on his finger from an espresso machine.      Nursing Notes were all reviewed and agreed with or any disagreements were addressed in the HPI.     REVIEW OF SYSTEMS      Review of Systems     Positives and Pertinent negatives as per HPI.     PAST HISTORY      Past Medical History:  Past Medical History        Past Medical History:   Diagnosis Date    Anxiety      Depression      PTSD (post-traumatic stress disorder)      Tonsil stone                 Past Surgical History:  Past Surgical History         Past Surgical History:   Procedure Laterality Date    TONSILLECTOMY Bilateral 05/2019            Family History:  Family History   History reviewed. No pertinent family history.        Social History:  Social History   Social History            Tobacco Use    Smoking status: Never    Smokeless tobacco: Never   Substance Use Topics    Alcohol use: No    Drug use: Yes       Types: Marijuana (Weed)            Allergies:  Allergies   No Known Allergies        CURRENT MEDICATIONS       Discharge Medication List as of 5/12/2025 11:09 AM              CONTINUE these medications which have NOT CHANGED     Details   buPROPion (WELLBUTRIN XL) 150 MG extended release tablet Take 1 tablet by mouth daily, Disp-30 tablet, R-5Normal                 SCREENINGS            No data recorded      PHYSICAL EXAM           ED Triage Vitals [05/12/25 1033]   Encounter Vitals Group      /71      Systolic BP Percentile        Diastolic BP Percentile        Pulse 92      Respirations 16